# Patient Record
Sex: MALE | Race: WHITE | NOT HISPANIC OR LATINO | URBAN - METROPOLITAN AREA
[De-identification: names, ages, dates, MRNs, and addresses within clinical notes are randomized per-mention and may not be internally consistent; named-entity substitution may affect disease eponyms.]

---

## 2022-08-12 VITALS
RESPIRATION RATE: 16 BRPM | OXYGEN SATURATION: 96 % | HEIGHT: 74 IN | SYSTOLIC BLOOD PRESSURE: 116 MMHG | HEART RATE: 60 BPM | DIASTOLIC BLOOD PRESSURE: 70 MMHG | WEIGHT: 203.93 LBS | TEMPERATURE: 98 F

## 2022-08-12 NOTE — ASU PATIENT PROFILE, ADULT - FALL HARM RISK - UNIVERSAL INTERVENTIONS
Bed in lowest position, wheels locked, appropriate side rails in place/Call bell, personal items and telephone in reach/Instruct patient to call for assistance before getting out of bed or chair/Non-slip footwear when patient is out of bed/[place] to call system/Physically safe environment - no spills, clutter or unnecessary equipment/Purposeful Proactive Rounding/Room/bathroom lighting operational, light cord in reach

## 2022-08-15 ENCOUNTER — INPATIENT (INPATIENT)
Facility: HOSPITAL | Age: 47
LOS: 1 days | Discharge: HOME | DRG: 909 | End: 2022-08-17
Attending: SURGERY | Admitting: SURGERY
Payer: COMMERCIAL

## 2022-08-15 DIAGNOSIS — Z90.49 ACQUIRED ABSENCE OF OTHER SPECIFIED PARTS OF DIGESTIVE TRACT: Chronic | ICD-10-CM

## 2022-08-15 PROCEDURE — 88302 TISSUE EXAM BY PATHOLOGIST: CPT | Mod: 26

## 2022-08-15 DEVICE — MESH HERNIA VENTRALEX ST SMALL 1.7": Type: IMPLANTABLE DEVICE | Status: FUNCTIONAL

## 2022-08-15 DEVICE — MESH HERNIA VENTRALEX ST MEDIUM 2.5": Type: IMPLANTABLE DEVICE | Status: FUNCTIONAL

## 2022-08-15 RX ORDER — GABAPENTIN 400 MG/1
600 CAPSULE ORAL THREE TIMES A DAY
Refills: 0 | Status: DISCONTINUED | OUTPATIENT
Start: 2022-08-15 | End: 2022-08-16

## 2022-08-15 RX ORDER — CEFAZOLIN SODIUM 1 G
2000 VIAL (EA) INJECTION EVERY 8 HOURS
Refills: 0 | Status: DISCONTINUED | OUTPATIENT
Start: 2022-08-16 | End: 2022-08-16

## 2022-08-15 RX ORDER — ACETAMINOPHEN 500 MG
1000 TABLET ORAL EVERY 6 HOURS
Refills: 0 | Status: DISCONTINUED | OUTPATIENT
Start: 2022-08-15 | End: 2022-08-16

## 2022-08-15 RX ORDER — HEPARIN SODIUM 5000 [USP'U]/ML
5000 INJECTION INTRAVENOUS; SUBCUTANEOUS EVERY 8 HOURS
Refills: 0 | Status: DISCONTINUED | OUTPATIENT
Start: 2022-08-16 | End: 2022-08-16

## 2022-08-15 RX ORDER — OXYCODONE HYDROCHLORIDE 5 MG/1
1 TABLET ORAL
Qty: 10 | Refills: 0
Start: 2022-08-15 | End: 2022-08-17

## 2022-08-15 RX ORDER — DOCUSATE SODIUM 100 MG
1 CAPSULE ORAL
Qty: 15 | Refills: 0
Start: 2022-08-15 | End: 2022-08-19

## 2022-08-15 RX ORDER — OXYCODONE HYDROCHLORIDE 5 MG/1
5 TABLET ORAL EVERY 4 HOURS
Refills: 0 | Status: DISCONTINUED | OUTPATIENT
Start: 2022-08-15 | End: 2022-08-16

## 2022-08-15 RX ORDER — DEXTROSE MONOHYDRATE, SODIUM CHLORIDE, AND POTASSIUM CHLORIDE 50; .745; 4.5 G/1000ML; G/1000ML; G/1000ML
1000 INJECTION, SOLUTION INTRAVENOUS
Refills: 0 | Status: DISCONTINUED | OUTPATIENT
Start: 2022-08-15 | End: 2022-08-16

## 2022-08-15 RX ORDER — HYDROMORPHONE HYDROCHLORIDE 2 MG/ML
0.5 INJECTION INTRAMUSCULAR; INTRAVENOUS; SUBCUTANEOUS EVERY 6 HOURS
Refills: 0 | Status: DISCONTINUED | OUTPATIENT
Start: 2022-08-15 | End: 2022-08-16

## 2022-08-15 RX ADMIN — HEPARIN SODIUM 5000 UNIT(S): 5000 INJECTION INTRAVENOUS; SUBCUTANEOUS at 23:02

## 2022-08-15 RX ADMIN — Medication 1000 MILLIGRAM(S): at 23:02

## 2022-08-15 RX ADMIN — GABAPENTIN 600 MILLIGRAM(S): 400 CAPSULE ORAL at 23:02

## 2022-08-15 RX ADMIN — DEXTROSE MONOHYDRATE, SODIUM CHLORIDE, AND POTASSIUM CHLORIDE 75 MILLILITER(S): 50; .745; 4.5 INJECTION, SOLUTION INTRAVENOUS at 19:10

## 2022-08-15 RX ADMIN — Medication 100 MILLIGRAM(S): at 23:02

## 2022-08-15 NOTE — ASU DISCHARGE PLAN (ADULT/PEDIATRIC) - ASU DC SPECIAL INSTRUCTIONSFT
- Remain hunched 30 degrees at hip.   - Do not place hot or cold packs on incision  - Take colace for as long as you are taking opioid pain medication.

## 2022-08-15 NOTE — ASU DISCHARGE PLAN (ADULT/PEDIATRIC) - PROVIDER TOKENS
PROVIDER:[TOKEN:[[id number]],FOLLOWUP:[1 week]],PROVIDER:[TOKEN:[[id number]],FOLLOWUP:[1 week]]

## 2022-08-15 NOTE — ASU DISCHARGE PLAN (ADULT/PEDIATRIC) - CARE PROVIDER_API CALL
Philip Greer)  Surgery  [address], [address]  [place] [address]  Phone: [phone]  Fax: [phone]  Follow Up Time: 1 week    Starla Caldwell)  Plastic Surgery; Surgery  [address] [place] [id number]  Phone: [phone]  Fax: [phone]  Follow Up Time: 1 week

## 2022-08-15 NOTE — PROGRESS NOTE ADULT - SUBJECTIVE AND OBJECTIVE BOX
Procedure:  Surgeon:    S: Pt has no complaints. Denies CP, SOB, KENDALL, calf tenderness. Pain controlled with medication.    O:  T(C): 36.7 (08-15-22 @ 20:00), Max: 36.7 (08-15-22 @ 20:00)  T(F): 98.1 (08-15-22 @ 20:00), Max: 98.1 (08-15-22 @ 20:00)  HR: 68 (08-15-22 @ 20:00) (63 - 69)  BP: 96/55 (08-15-22 @ 20:00) (90/55 - 104/52)  RR: 19 (08-15-22 @ 20:00) (15 - 20)  SpO2: 95% (08-15-22 @ 20:00) (95% - 98%)  Wt(kg): --            Gen: NAD, resting comfortably in bed  C/V: NSR  Pulm: Nonlabored breathing, no respiratory distress  Abd: soft, NT/ND Incision:  Extrem: WWP, no calf edema, SCDs in place      A/P: 47yMale s/p above procedure  Diet:  IVF:  Pain/nausea control  DVT ppx:  Dispo plan: Procedure: Incisional hernia repair with mesh (w/ plastics-abdominoplasy and liposuction)  Surgeon: Dr. Greer    S: Pt seen and examined at bedside in PACU. He has no complaints. Denies N/V, CP, SOB, KENDALL, calf tenderness, dizziness, lightheadedness. Pain well controlled with medication. Pt flexed 30 degrees at hips, aware that he cannot lay flat or stand up straight. Vitally stable.    O:  T(C): 36.7 (08-15-22 @ 20:00), Max: 36.7 (08-15-22 @ 20:00)  T(F): 98.1 (08-15-22 @ 20:00), Max: 98.1 (08-15-22 @ 20:00)  HR: 68 (08-15-22 @ 20:00) (63 - 69)  BP: 96/55 (08-15-22 @ 20:00) (90/55 - 104/52)  RR: 19 (08-15-22 @ 20:00) (15 - 20)  SpO2: 95% (08-15-22 @ 20:00) (95% - 98%)      Gen: NAD, resting comfortably in bed  C/V: NSR  Pulm: Nonlabored breathing, no respiratory distress  Abd: soft, non-distended, non-tender, no rebound or guarding, abd binder in place, JAMES w SS drainage  Extrem: WWP, no calf edema, SCDs in place      A/P: 47M with no significant PMH/PSH now s/p liposuction, abdominoplasty, and ventral incisional hernia repair with mesh x2 8/15.    Admit 23 obs regional 9 [facility], Dr. Greer, T4  FLD, adv to regular in AM if tolerating   Ancef   Pain/nausea control PRN (no toradol, tylenol RTC, gabapentin 600 TID RTC, dilaudid breakthrough, oxycodone severe, Zofran PRN)  OOBA/SCDs/SQH/IS  AM Labs   Activity and positioning per plastic surgery

## 2022-08-16 LAB
ALBUMIN SERPL ELPH-MCNC: 2.6 G/DL — LOW (ref 3.3–5)
ALBUMIN SERPL ELPH-MCNC: 3.5 G/DL — SIGNIFICANT CHANGE UP (ref 3.3–5)
ALP SERPL-CCNC: 33 U/L — LOW (ref 40–120)
ALP SERPL-CCNC: 43 U/L — SIGNIFICANT CHANGE UP (ref 40–120)
ALT FLD-CCNC: 13 U/L — SIGNIFICANT CHANGE UP (ref 10–45)
ALT FLD-CCNC: 14 U/L — SIGNIFICANT CHANGE UP (ref 10–45)
ANION GAP SERPL CALC-SCNC: 7 MMOL/L — SIGNIFICANT CHANGE UP (ref 5–17)
ANION GAP SERPL CALC-SCNC: 7 MMOL/L — SIGNIFICANT CHANGE UP (ref 5–17)
APTT BLD: 23 SEC — LOW (ref 27.5–35.5)
AST SERPL-CCNC: 16 U/L — SIGNIFICANT CHANGE UP (ref 10–40)
AST SERPL-CCNC: 25 U/L — SIGNIFICANT CHANGE UP (ref 10–40)
BASE EXCESS BLDA CALC-SCNC: -3.3 MMOL/L — LOW (ref -2–3)
BILIRUB SERPL-MCNC: 0.8 MG/DL — SIGNIFICANT CHANGE UP (ref 0.2–1.2)
BILIRUB SERPL-MCNC: 0.8 MG/DL — SIGNIFICANT CHANGE UP (ref 0.2–1.2)
BLD GP AB SCN SERPL QL: NEGATIVE — SIGNIFICANT CHANGE UP
BLD GP AB SCN SERPL QL: NEGATIVE — SIGNIFICANT CHANGE UP
BUN SERPL-MCNC: 13 MG/DL — SIGNIFICANT CHANGE UP (ref 7–23)
BUN SERPL-MCNC: 13 MG/DL — SIGNIFICANT CHANGE UP (ref 7–23)
CALCIUM SERPL-MCNC: 7.8 MG/DL — LOW (ref 8.4–10.5)
CALCIUM SERPL-MCNC: 8.3 MG/DL — LOW (ref 8.4–10.5)
CHLORIDE SERPL-SCNC: 105 MMOL/L — SIGNIFICANT CHANGE UP (ref 96–108)
CHLORIDE SERPL-SCNC: 106 MMOL/L — SIGNIFICANT CHANGE UP (ref 96–108)
CO2 BLDA-SCNC: 22 MMOL/L — SIGNIFICANT CHANGE UP (ref 19–24)
CO2 SERPL-SCNC: 24 MMOL/L — SIGNIFICANT CHANGE UP (ref 22–31)
CO2 SERPL-SCNC: 25 MMOL/L — SIGNIFICANT CHANGE UP (ref 22–31)
COHGB MFR BLDA: 1.8 % — SIGNIFICANT CHANGE UP
CREAT SERPL-MCNC: 0.89 MG/DL — SIGNIFICANT CHANGE UP (ref 0.5–1.3)
CREAT SERPL-MCNC: 0.9 MG/DL — SIGNIFICANT CHANGE UP (ref 0.5–1.3)
EGFR: 105 ML/MIN/1.73M2 — SIGNIFICANT CHANGE UP
EGFR: 106 ML/MIN/1.73M2 — SIGNIFICANT CHANGE UP
GLUCOSE SERPL-MCNC: 132 MG/DL — HIGH (ref 70–99)
GLUCOSE SERPL-MCNC: 164 MG/DL — HIGH (ref 70–99)
HCO3 BLDA-SCNC: 21 MMOL/L — SIGNIFICANT CHANGE UP (ref 21–28)
HCT VFR BLD CALC: 25.9 % — LOW (ref 39–50)
HCT VFR BLD CALC: 26.4 % — LOW (ref 39–50)
HCT VFR BLD CALC: 26.6 % — LOW (ref 39–50)
HCT VFR BLD CALC: 32.1 % — LOW (ref 39–50)
HGB BLD-MCNC: 11.3 G/DL — LOW (ref 13–17)
HGB BLD-MCNC: 9 G/DL — LOW (ref 13–17)
HGB BLD-MCNC: 9.2 G/DL — LOW (ref 13–17)
HGB BLD-MCNC: 9.3 G/DL — LOW (ref 13–17)
HGB BLDA-MCNC: 7.3 G/DL — LOW (ref 12.6–17.4)
INR BLD: 1.11 — SIGNIFICANT CHANGE UP (ref 0.88–1.16)
INR BLD: 1.21 — HIGH (ref 0.88–1.16)
MAGNESIUM SERPL-MCNC: 1.6 MG/DL — SIGNIFICANT CHANGE UP (ref 1.6–2.6)
MAGNESIUM SERPL-MCNC: 1.8 MG/DL — SIGNIFICANT CHANGE UP (ref 1.6–2.6)
MCHC RBC-ENTMCNC: 32.9 PG — SIGNIFICANT CHANGE UP (ref 27–34)
MCHC RBC-ENTMCNC: 33 PG — SIGNIFICANT CHANGE UP (ref 27–34)
MCHC RBC-ENTMCNC: 33 PG — SIGNIFICANT CHANGE UP (ref 27–34)
MCHC RBC-ENTMCNC: 33.3 PG — SIGNIFICANT CHANGE UP (ref 27–34)
MCHC RBC-ENTMCNC: 34.6 GM/DL — SIGNIFICANT CHANGE UP (ref 32–36)
MCHC RBC-ENTMCNC: 34.7 GM/DL — SIGNIFICANT CHANGE UP (ref 32–36)
MCHC RBC-ENTMCNC: 35.2 GM/DL — SIGNIFICANT CHANGE UP (ref 32–36)
MCHC RBC-ENTMCNC: 35.2 GM/DL — SIGNIFICANT CHANGE UP (ref 32–36)
MCV RBC AUTO: 93.3 FL — SIGNIFICANT CHANGE UP (ref 80–100)
MCV RBC AUTO: 94.7 FL — SIGNIFICANT CHANGE UP (ref 80–100)
MCV RBC AUTO: 94.9 FL — SIGNIFICANT CHANGE UP (ref 80–100)
MCV RBC AUTO: 95.3 FL — SIGNIFICANT CHANGE UP (ref 80–100)
METHGB MFR BLDA: 0.3 % — SIGNIFICANT CHANGE UP
NRBC # BLD: 0 /100 WBCS — SIGNIFICANT CHANGE UP (ref 0–0)
OXYHGB MFR BLDA: 97.8 % — HIGH (ref 90–95)
PCO2 BLDA: 34 MMHG — LOW (ref 35–48)
PH BLDA: 7.4 — SIGNIFICANT CHANGE UP (ref 7.35–7.45)
PHOSPHATE SERPL-MCNC: 3.5 MG/DL — SIGNIFICANT CHANGE UP (ref 2.5–4.5)
PHOSPHATE SERPL-MCNC: 3.9 MG/DL — SIGNIFICANT CHANGE UP (ref 2.5–4.5)
PLATELET # BLD AUTO: 186 K/UL — SIGNIFICANT CHANGE UP (ref 150–400)
PLATELET # BLD AUTO: 189 K/UL — SIGNIFICANT CHANGE UP (ref 150–400)
PLATELET # BLD AUTO: 207 K/UL — SIGNIFICANT CHANGE UP (ref 150–400)
PLATELET # BLD AUTO: 248 K/UL — SIGNIFICANT CHANGE UP (ref 150–400)
PO2 BLDA: 341 MMHG — HIGH (ref 83–108)
POTASSIUM SERPL-MCNC: 4.5 MMOL/L — SIGNIFICANT CHANGE UP (ref 3.5–5.3)
POTASSIUM SERPL-MCNC: 4.7 MMOL/L — SIGNIFICANT CHANGE UP (ref 3.5–5.3)
POTASSIUM SERPL-SCNC: 4.5 MMOL/L — SIGNIFICANT CHANGE UP (ref 3.5–5.3)
POTASSIUM SERPL-SCNC: 4.7 MMOL/L — SIGNIFICANT CHANGE UP (ref 3.5–5.3)
PROT SERPL-MCNC: 3.9 G/DL — LOW (ref 6–8.3)
PROT SERPL-MCNC: 5.1 G/DL — LOW (ref 6–8.3)
PROTHROM AB SERPL-ACNC: 13.2 SEC — SIGNIFICANT CHANGE UP (ref 10.5–13.4)
PROTHROM AB SERPL-ACNC: 14.4 SEC — HIGH (ref 10.5–13.4)
RBC # BLD: 2.73 M/UL — LOW (ref 4.2–5.8)
RBC # BLD: 2.79 M/UL — LOW (ref 4.2–5.8)
RBC # BLD: 2.83 M/UL — LOW (ref 4.2–5.8)
RBC # BLD: 3.39 M/UL — LOW (ref 4.2–5.8)
RBC # FLD: 11.8 % — SIGNIFICANT CHANGE UP (ref 10.3–14.5)
RBC # FLD: 11.9 % — SIGNIFICANT CHANGE UP (ref 10.3–14.5)
RBC # FLD: 12.1 % — SIGNIFICANT CHANGE UP (ref 10.3–14.5)
RBC # FLD: 12.2 % — SIGNIFICANT CHANGE UP (ref 10.3–14.5)
RH IG SCN BLD-IMP: POSITIVE — SIGNIFICANT CHANGE UP
RH IG SCN BLD-IMP: POSITIVE — SIGNIFICANT CHANGE UP
SAO2 % BLDA: 100 % — HIGH (ref 94–98)
SODIUM SERPL-SCNC: 136 MMOL/L — SIGNIFICANT CHANGE UP (ref 135–145)
SODIUM SERPL-SCNC: 138 MMOL/L — SIGNIFICANT CHANGE UP (ref 135–145)
WBC # BLD: 11.24 K/UL — HIGH (ref 3.8–10.5)
WBC # BLD: 15.56 K/UL — HIGH (ref 3.8–10.5)
WBC # BLD: 6.02 K/UL — SIGNIFICANT CHANGE UP (ref 3.8–10.5)
WBC # BLD: 7.12 K/UL — SIGNIFICANT CHANGE UP (ref 3.8–10.5)
WBC # FLD AUTO: 11.24 K/UL — HIGH (ref 3.8–10.5)
WBC # FLD AUTO: 15.56 K/UL — HIGH (ref 3.8–10.5)
WBC # FLD AUTO: 6.02 K/UL — SIGNIFICANT CHANGE UP (ref 3.8–10.5)
WBC # FLD AUTO: 7.12 K/UL — SIGNIFICANT CHANGE UP (ref 3.8–10.5)

## 2022-08-16 PROCEDURE — 93010 ELECTROCARDIOGRAM REPORT: CPT

## 2022-08-16 DEVICE — ARISTA 3GR: Type: IMPLANTABLE DEVICE | Status: FUNCTIONAL

## 2022-08-16 RX ORDER — ACETAMINOPHEN 500 MG
1000 TABLET ORAL EVERY 6 HOURS
Refills: 0 | Status: DISCONTINUED | OUTPATIENT
Start: 2022-08-16 | End: 2022-08-17

## 2022-08-16 RX ORDER — CEFAZOLIN SODIUM 1 G
2000 VIAL (EA) INJECTION EVERY 8 HOURS
Refills: 0 | Status: DISCONTINUED | OUTPATIENT
Start: 2022-08-16 | End: 2022-08-17

## 2022-08-16 RX ORDER — GABAPENTIN 400 MG/1
600 CAPSULE ORAL THREE TIMES A DAY
Refills: 0 | Status: DISCONTINUED | OUTPATIENT
Start: 2022-08-16 | End: 2022-08-17

## 2022-08-16 RX ORDER — MAGNESIUM SULFATE 500 MG/ML
1 VIAL (ML) INJECTION ONCE
Refills: 0 | Status: DISCONTINUED | OUTPATIENT
Start: 2022-08-16 | End: 2022-08-16

## 2022-08-16 RX ORDER — ONDANSETRON 8 MG/1
4 TABLET, FILM COATED ORAL ONCE
Refills: 0 | Status: COMPLETED | OUTPATIENT
Start: 2022-08-16 | End: 2022-08-16

## 2022-08-16 RX ORDER — SODIUM CHLORIDE 9 MG/ML
1000 INJECTION, SOLUTION INTRAVENOUS
Refills: 0 | Status: DISCONTINUED | OUTPATIENT
Start: 2022-08-16 | End: 2022-08-16

## 2022-08-16 RX ORDER — SODIUM CHLORIDE 9 MG/ML
1000 INJECTION, SOLUTION INTRAVENOUS ONCE
Refills: 0 | Status: COMPLETED | OUTPATIENT
Start: 2022-08-16 | End: 2022-08-16

## 2022-08-16 RX ORDER — HYDROMORPHONE HYDROCHLORIDE 2 MG/ML
0.5 INJECTION INTRAMUSCULAR; INTRAVENOUS; SUBCUTANEOUS EVERY 6 HOURS
Refills: 0 | Status: DISCONTINUED | OUTPATIENT
Start: 2022-08-16 | End: 2022-08-17

## 2022-08-16 RX ORDER — SODIUM CHLORIDE 9 MG/ML
1000 INJECTION, SOLUTION INTRAVENOUS
Refills: 0 | Status: DISCONTINUED | OUTPATIENT
Start: 2022-08-16 | End: 2022-08-17

## 2022-08-16 RX ORDER — ONDANSETRON 8 MG/1
4 TABLET, FILM COATED ORAL EVERY 6 HOURS
Refills: 0 | Status: DISCONTINUED | OUTPATIENT
Start: 2022-08-16 | End: 2022-08-17

## 2022-08-16 RX ORDER — OXYCODONE HYDROCHLORIDE 5 MG/1
5 TABLET ORAL EVERY 6 HOURS
Refills: 0 | Status: DISCONTINUED | OUTPATIENT
Start: 2022-08-16 | End: 2022-08-17

## 2022-08-16 RX ADMIN — SODIUM CHLORIDE 130 MILLILITER(S): 9 INJECTION, SOLUTION INTRAVENOUS at 04:10

## 2022-08-16 RX ADMIN — Medication 1000 MILLIGRAM(S): at 05:11

## 2022-08-16 RX ADMIN — Medication 100 MILLIGRAM(S): at 22:29

## 2022-08-16 RX ADMIN — OXYCODONE HYDROCHLORIDE 5 MILLIGRAM(S): 5 TABLET ORAL at 22:30

## 2022-08-16 RX ADMIN — ONDANSETRON 4 MILLIGRAM(S): 8 TABLET, FILM COATED ORAL at 05:18

## 2022-08-16 RX ADMIN — Medication 100 MILLIGRAM(S): at 06:53

## 2022-08-16 RX ADMIN — Medication 1000 MILLIGRAM(S): at 04:11

## 2022-08-16 RX ADMIN — SODIUM CHLORIDE 1000 MILLILITER(S): 9 INJECTION, SOLUTION INTRAVENOUS at 05:05

## 2022-08-16 RX ADMIN — Medication 1000 MILLIGRAM(S): at 00:02

## 2022-08-16 RX ADMIN — Medication 100 MILLIGRAM(S): at 15:23

## 2022-08-16 RX ADMIN — SODIUM CHLORIDE 90 MILLILITER(S): 9 INJECTION, SOLUTION INTRAVENOUS at 22:35

## 2022-08-16 RX ADMIN — Medication 1000 MILLIGRAM(S): at 17:40

## 2022-08-16 RX ADMIN — SODIUM CHLORIDE 1000 MILLILITER(S): 9 INJECTION, SOLUTION INTRAVENOUS at 03:10

## 2022-08-16 RX ADMIN — GABAPENTIN 600 MILLIGRAM(S): 400 CAPSULE ORAL at 16:17

## 2022-08-16 RX ADMIN — SODIUM CHLORIDE 120 MILLILITER(S): 9 INJECTION, SOLUTION INTRAVENOUS at 07:14

## 2022-08-16 RX ADMIN — OXYCODONE HYDROCHLORIDE 5 MILLIGRAM(S): 5 TABLET ORAL at 23:00

## 2022-08-16 RX ADMIN — GABAPENTIN 600 MILLIGRAM(S): 400 CAPSULE ORAL at 22:29

## 2022-08-16 RX ADMIN — Medication 1000 MILLIGRAM(S): at 18:10

## 2022-08-16 NOTE — H&P ADULT - ASSESSMENT
47M with no significant PMH/PSH now s/p liposuction, abdominoplasty, and ventral incisional hernia repair with mesh x2 8/15 RTOR for bleed now s/p evacuation of 1.2L hematoma and control of bleeding perforating vessel 8/16.    Regular diet   IVF   Ancef   Pain/nausea control PRN   OOBA/SCDs/IS/hold SQH   Activity and positioning per plastic surgery  AM labs   JAMES x2, strip TID     Plan discussed with attending and chief resident.   ____________________________________________________  [facility]   Surgery

## 2022-08-16 NOTE — PROGRESS NOTE ADULT - SUBJECTIVE AND OBJECTIVE BOX
POd 1 hernia repair, TTyck, liposuction posteior brenton    pt BP 50's overnight,   70 this am  3rd bolus ordered    BP 94/55 after 3rd bolus HR 84    exam: abdominal flap distended    likely bleeding    no distention posterior flank    TO OR FOR exploration    discussed with Dr. Greer  in agreement

## 2022-08-16 NOTE — BRIEF OPERATIVE NOTE - OPERATION/FINDINGS
Liposuction by plastic surgery. Abdominal flap raised by plastic surgery. LLQ incisional hernia with incarcerated fat, hernia sac ligated and reduced.  Small ventrelex ST mesh secured with Maxon x2 and anterior defect closed with Maxon x2. Umbilical hernia reduced manually and medium ventrelex ST mesh secured with Maxon x2 figure-of-eight and anterior defect closed with Maxon x3. Midline plicated from xyphoid to pubis. Case completion by plastic surgery.
abdominoplasty incision reopened and JAMES drains removed; hematoma evacuated and anterior rectus irrigated. Bleeding  vessels identified and ligated, and hemostasis achieved. Roly x 3 used. Abdominoplasty incision closed in 3 layers
Reopened abdominoplasty incision. Removal of JAMES drains x2. Identification of actively bleeding vessel L anterior rectus, likely . Extensive evacuation of hematoma 1.2L and washout. Hemostasis confirmed. TXA administered IV. Roly x3. New JAMES drain x2 placed. Incision closed by plastic surgery.

## 2022-08-16 NOTE — BRIEF OPERATIVE NOTE - ANTIBIOTIC PROTOCOL
Followed protocol
Ancef completing from floors/Followed protocol
Ancef 2G, redosed around 415pm/Followed protocol

## 2022-08-16 NOTE — PROGRESS NOTE ADULT - SUBJECTIVE AND OBJECTIVE BOX
SURGERY DAILY PROGRESS NOTE:     SUBJECTIVE/ROS: No acute overnight events. Patient seen and examined at bedside during morning rounds. Asymptomatic. Systolic BP's low to the 70-80's overnight, despite NS bolus x 2.     OBJECTIVE:    Vital Signs Last 24 Hrs  T(C): 36.9 (16 Aug 2022 06:54), Max: 36.9 (16 Aug 2022 06:02)  T(F): 98.4 (16 Aug 2022 06:54), Max: 98.4 (16 Aug 2022 06:02)  HR: 74 (16 Aug 2022 06:54) (51 - 79)  BP: 94/55 (16 Aug 2022 06:54) (49/30 - 109/69)  BP(mean): 69 (16 Aug 2022 06:45) (54 - 75)  RR: 18 (16 Aug 2022 06:54) (15 - 20)  SpO2: 97% (16 Aug 2022 06:45) (92% - 98%)    Parameters below as of 16 Aug 2022 06:45  Patient On (Oxygen Delivery Method): room air    I&O's Detail    15 Aug 2022 07:01  -  16 Aug 2022 07:00  --------------------------------------------------------  IN:    dextrose 5% + sodium chloride 0.9% w/ Additives: 375 mL    Lactated Ringers: 260 mL    Lactated Ringers Bolus: 2000 mL    Oral Fluid: 1000 mL  Total IN: 3635 mL    OUT:    Blood Loss (mL): 200 mL    Bulb (mL): 20 mL    Bulb (mL): 16 mL    Voided (mL): 1900 mL  Total OUT: 2136 mL    Total NET: 1499 mL    PHYSICAL EXAM:  Constitutional: well developed, well nourished, NAD  Eyes: anicteric  ENMT: normal facies, symmetric  Neck: supple  Respiratory: airway patent, unlabored breathing  Gastrointestinal: abdomen soft, nontender, somewhat distended, ecchymosis at upper abdomen. JAMES x 2 SS, no flank hematoma visible  Extremities: FROM, warm    LABS:                        9.0    11.24 )-----------( 207      ( 16 Aug 2022 06:47 )             25.9     08-16    136  |  105  |  13  ----------------------------<  164<H>  4.7   |  24  |  0.89    Ca    8.3<L>      16 Aug 2022 03:31  Phos  3.9     08-16  Mg     1.8     08-16    TPro  5.1<L>  /  Alb  3.5  /  TBili  0.8  /  DBili  x   /  AST  25  /  ALT  14  /  AlkPhos  43  08-16    PT/INR - ( 16 Aug 2022 03:30 )   PT: 13.2 sec;   INR: 1.11

## 2022-08-16 NOTE — BRIEF OPERATIVE NOTE - NSICDXBRIEFPREOP_GEN_ALL_CORE_FT
PRE-OP DIAGNOSIS:  Hematoma 16-Aug-2022 10:31:34  Angelita Wagner  
PRE-OP DIAGNOSIS:  Incisional hernia 15-Aug-2022 18:04:04  Angelita Wagner

## 2022-08-16 NOTE — BRIEF OPERATIVE NOTE - VENOUS THROMBOEMBOLISM PROPHYLAXIS THERAPY
Requested medication(s) are due for refill today: Yes  Patient has already received a courtesy refill: No  Other reason request has been forwarded to provider: failed protocol
SCDs

## 2022-08-16 NOTE — BRIEF OPERATIVE NOTE - NSICDXBRIEFPROCEDURE_GEN_ALL_CORE_FT
PROCEDURES:  Incisional hernia repair 15-Aug-2022 18:03:51 x2 with mesh Angelita Wagner  
PROCEDURES:  Evacuation, hematoma, torso 16-Aug-2022 10:31:20  Angelita Wagner

## 2022-08-16 NOTE — PACU DISCHARGE NOTE - COMMENTS
Patient hemodynamically stable. Abdomen soft upon palpation, non tender. Dressing CDI. x 2 JAMES intact and draining sero-sang. Barros intact and adequate urine output. D/C a-line. Denies pain or nausea. Cleared by anesthesiologist to go to floor. report given to EMILY Sharp.

## 2022-08-16 NOTE — BRIEF OPERATIVE NOTE - NSICDXBRIEFPOSTOP_GEN_ALL_CORE_FT
POST-OP DIAGNOSIS:  Incisional hernia 15-Aug-2022 18:04:08  Angelita Wagner  
POST-OP DIAGNOSIS:  Hematoma 16-Aug-2022 10:31:47  Angelita Wagner

## 2022-08-16 NOTE — H&P ADULT - HISTORY OF PRESENT ILLNESS
47M with no significant PMH/PSH now s/p liposuction, abdominoplasty, and ventral incisional hernia repair with mesh x2 8/15 RTOR for bleed now s/p evacuation of 1.2L hematoma and control of bleeding perforating vessel 8/16.

## 2022-08-16 NOTE — PROGRESS NOTE ADULT - SUBJECTIVE AND OBJECTIVE BOX
INTERVAL HPI/OVERNIGHT EVENTS: POC wnl, TOV passed; pain well controlled, 1 JAMES -ss, see chart note, several episodes of lightheadedness when standing, SBP to 50s, HR 60s, pt transferred to [facility]. LR 1L bolus x 2.    STATUS POST: : liposuction, abdominoplasty, ventral hernia repair x2 with ventrelex mesh    POST OPERATIVE DAY #: 1    SUBJECTIVE: Patient seen and examined at bedside with chief. Has no complaints at this time. Denies abdominal pain, n/v, sob, cp. +F/-BM.      ceFAZolin   IVPB 2000 milliGRAM(s) IV Intermittent every 8 hours  heparin   Injectable 5000 Unit(s) SubCutaneous every 8 hours      Vital Signs Last 24 Hrs  T(C): 36.9 (16 Aug 2022 07:18), Max: 36.9 (16 Aug 2022 06:02)  T(F): 98.4 (16 Aug 2022 06:54), Max: 98.4 (16 Aug 2022 06:02)  HR: 74 (16 Aug 2022 07:18) (51 - 79)  BP: 94/55 (16 Aug 2022 07:18) (49/30 - 109/69)  BP(mean): 69 (16 Aug 2022 07:18) (54 - 75)  RR: 18 (16 Aug 2022 07:18) (15 - 20)  SpO2: 97% (16 Aug 2022 07:18) (92% - 98%)    Parameters below as of 16 Aug 2022 06:45  Patient On (Oxygen Delivery Method): room air      I&O's Detail    15 Aug 2022 07:01  -  16 Aug 2022 07:00  --------------------------------------------------------  IN:    dextrose 5% + sodium chloride 0.9% w/ Additives: 375 mL    Lactated Ringers: 260 mL    Lactated Ringers Bolus: 2000 mL    Oral Fluid: 1000 mL  Total IN: 3635 mL    OUT:    Blood Loss (mL): 200 mL    Bulb (mL): 20 mL    Bulb (mL): 16 mL    Voided (mL): 1900 mL  Total OUT: 2136 mL    Total NET: 1499 mL          General: NAD, resting comfortably in bed  C/V: NSR  Pulm: Nonlabored breathing, no respiratory distress  Abd: soft, NT/ND. Mild bruising noted. Abdominal binder in place.  Extrem: WWP, no edema, SCDs in place        LABS:                        9.0    11.24 )-----------( 207      ( 16 Aug 2022 06:47 )             25.9     08-16    138  |  106  |  13  ----------------------------<  132<H>  4.5   |  25  |  0.90    Ca    7.8<L>      16 Aug 2022 06:43  Phos  3.5     08-16  Mg     1.6     08-16    TPro  3.9<L>  /  Alb  2.6<L>  /  TBili  0.8  /  DBili  x   /  AST  16  /  ALT  13  /  AlkPhos  33<L>  08-16    PT/INR - ( 16 Aug 2022 06:43 )   PT: 14.4 sec;   INR: 1.21          PTT - ( 16 Aug 2022 06:43 )  PTT:23.0 sec      RADIOLOGY & ADDITIONAL STUDIES:   Patient states been sick x 3 weeks. Patient requested by doctor to come and see about gallbladder.

## 2022-08-16 NOTE — CHART NOTE - NSCHARTNOTEFT_GEN_A_CORE
Patient is a 47 year old male with no past medical or surgical history who is s/p an incisional ventral hernia repair with mesh x 2, liposuction, and abdominoplasty who had an episode of lightheadedness when sitting on the edge of the bed to go to the bathroom. Per RN, BP was 49/30, HR 79 during the time of the episode. The patient was immediately placed in Trendelenburg position with resolution of his lightheadedness and BP 84/40, HR 61. Team 4 was called to bedside and assessed patient. He was immediately started on a 1L bolus of LR, and on assessment denied any lightheadedness/dizziness, pain, nausea, blurry vision or headaches. He has had adequate urine output, with 1 L produced since removal of raza and has adequate oral intake. On exam, his abdomen was soft, mildly tender to palpation, and nondistended. Dressings were in place with no surrounding erythema, induration, or fluctuance. JAMES drains x 2 with serosanguinous output.     Most recently, BP was 93/61, HR 64 and patient is asymptomatic.     Likely an orthostatic episode  -Upgrade to tele  -STAT labs  -EKG  -1L LR Bolus, 
STATUS POST:  Abdominal Hematoma evacuation and washout    SUBJECTIVE: Pt seen 2 hours post-op in PACU. Doing well no complaints. Denies dizziness, pain, N/V/F/C, chest pain, SOB, or leg swelling.     Vital Signs Last 24 Hrs  T(C): 37.2 (16 Aug 2022 10:00), Max: 37.2 (16 Aug 2022 10:00)  T(F): 99 (16 Aug 2022 10:00), Max: 99 (16 Aug 2022 10:00)  HR: 74 (16 Aug 2022 12:55) (51 - 79)  BP: 98/61 (16 Aug 2022 12:55) (49/30 - 109/69)  BP(mean): 75 (16 Aug 2022 12:55) (54 - 75)  RR: 15 (16 Aug 2022 12:55) (13 - 20)  SpO2: 97% (16 Aug 2022 12:55) (92% - 100%)    Parameters below as of 16 Aug 2022 12:55  Patient On (Oxygen Delivery Method): room air      I&O's Summary    15 Aug 2022 07:01  -  16 Aug 2022 07:00  --------------------------------------------------------  IN: 3635 mL / OUT: 2136 mL / NET: 1499 mL    16 Aug 2022 07:01  -  16 Aug 2022 13:08  --------------------------------------------------------  IN: 270 mL / OUT: 590 mL / NET: -320 mL      I&O's Detail    15 Aug 2022 07:01  -  16 Aug 2022 07:00  --------------------------------------------------------  IN:    dextrose 5% + sodium chloride 0.9% w/ Additives: 375 mL    Lactated Ringers: 260 mL    Lactated Ringers Bolus: 2000 mL    Oral Fluid: 1000 mL  Total IN: 3635 mL    OUT:    Blood Loss (mL): 200 mL    Bulb (mL): 20 mL    Bulb (mL): 16 mL    Voided (mL): 1900 mL  Total OUT: 2136 mL    Total NET: 1499 mL      16 Aug 2022 07:01  -  16 Aug 2022 13:08  --------------------------------------------------------  IN:    Lactated Ringers: 270 mL  Total IN: 270 mL    OUT:    Bulb (mL): 5 mL    Bulb (mL): 55 mL    Indwelling Catheter - Urethral (mL): 530 mL  Total OUT: 590 mL    Total NET: -320 mL            LABS:                        9.3    7.12  )-----------( 186      ( 16 Aug 2022 12:10 )             26.4     08-16    138  |  106  |  13  ----------------------------<  132<H>  4.5   |  25  |  0.90    Ca    7.8<L>      16 Aug 2022 06:43  Phos  3.5     08-16  Mg     1.6     08-16    TPro  3.9<L>  /  Alb  2.6<L>  /  TBili  0.8  /  DBili  x   /  AST  16  /  ALT  13  /  AlkPhos  33<L>  08-16    PT/INR - ( 16 Aug 2022 06:43 )   PT: 14.4 sec;   INR: 1.21          PTT - ( 16 Aug 2022 06:43 )  PTT:23.0 sec      Physical exam : Neuro: Alert and Oriented X 4. CN II-IX intact. No apparent focal neural deficits  HEENT: NCAT. Mucous membranes moist. EOMI  Respiratory: CTA b/l. no respiratory distress  Cardiovascular: NSR, RRR  Gastrointestinal: soft, NT/ND. No rebound/guarding. Ecchymosis on the anterior epigastric and b/l flanks.                 Incision: Dressings c/d/i.                 Drains: 2x JAMES draining serosanguinous fluid R>L  Extremities: (-) edema b/l. SCDs in place.        A/P: 47y Male   - Diet: Regular  - Activity: OOB as soon as possible  - Labs: CBC @1800  - Pain medication: Tylenol, Gabapentin, dilaudid, oxycodone  - Hold SQH  - Ancef until discharge  - Strip drains 3x daily

## 2022-08-17 VITALS — TEMPERATURE: 98 F

## 2022-08-17 LAB
ANION GAP SERPL CALC-SCNC: 5 MMOL/L — SIGNIFICANT CHANGE UP (ref 5–17)
ANION GAP SERPL CALC-SCNC: 8 MMOL/L — SIGNIFICANT CHANGE UP (ref 5–17)
BILIRUB SERPL-MCNC: 0.4 MG/DL — SIGNIFICANT CHANGE UP (ref 0.2–1.2)
BUN SERPL-MCNC: 11 MG/DL — SIGNIFICANT CHANGE UP (ref 7–23)
BUN SERPL-MCNC: 11 MG/DL — SIGNIFICANT CHANGE UP (ref 7–23)
CA-I BLD-SCNC: 4.6 MG/DL — SIGNIFICANT CHANGE UP (ref 4.5–5.6)
CALCIUM SERPL-MCNC: 7.8 MG/DL — LOW (ref 8.4–10.5)
CALCIUM SERPL-MCNC: 7.9 MG/DL — LOW (ref 8.4–10.5)
CHLORIDE SERPL-SCNC: 102 MMOL/L — SIGNIFICANT CHANGE UP (ref 96–108)
CHLORIDE SERPL-SCNC: 103 MMOL/L — SIGNIFICANT CHANGE UP (ref 96–108)
CO2 SERPL-SCNC: 25 MMOL/L — SIGNIFICANT CHANGE UP (ref 22–31)
CO2 SERPL-SCNC: 27 MMOL/L — SIGNIFICANT CHANGE UP (ref 22–31)
CREAT SERPL-MCNC: 0.86 MG/DL — SIGNIFICANT CHANGE UP (ref 0.5–1.3)
CREAT SERPL-MCNC: 0.9 MG/DL — SIGNIFICANT CHANGE UP (ref 0.5–1.3)
EGFR: 105 ML/MIN/1.73M2 — SIGNIFICANT CHANGE UP
EGFR: 107 ML/MIN/1.73M2 — SIGNIFICANT CHANGE UP
GLUCOSE SERPL-MCNC: 134 MG/DL — HIGH (ref 70–99)
GLUCOSE SERPL-MCNC: 140 MG/DL — HIGH (ref 70–99)
HCT VFR BLD CALC: 22 % — LOW (ref 39–50)
HCT VFR BLD CALC: 22.7 % — LOW (ref 39–50)
HCT VFR BLD CALC: 22.8 % — LOW (ref 39–50)
HGB BLD-MCNC: 7.8 G/DL — LOW (ref 13–17)
HGB BLD-MCNC: 7.8 G/DL — LOW (ref 13–17)
HGB BLD-MCNC: 8.2 G/DL — LOW (ref 13–17)
INR BLD: 1.07 — SIGNIFICANT CHANGE UP (ref 0.88–1.16)
MAGNESIUM SERPL-MCNC: 1.9 MG/DL — SIGNIFICANT CHANGE UP (ref 1.6–2.6)
MCHC RBC-ENTMCNC: 32.6 PG — SIGNIFICANT CHANGE UP (ref 27–34)
MCHC RBC-ENTMCNC: 33.2 PG — SIGNIFICANT CHANGE UP (ref 27–34)
MCHC RBC-ENTMCNC: 33.3 PG — SIGNIFICANT CHANGE UP (ref 27–34)
MCHC RBC-ENTMCNC: 34.4 GM/DL — SIGNIFICANT CHANGE UP (ref 32–36)
MCHC RBC-ENTMCNC: 35.5 GM/DL — SIGNIFICANT CHANGE UP (ref 32–36)
MCHC RBC-ENTMCNC: 36 GM/DL — SIGNIFICANT CHANGE UP (ref 32–36)
MCV RBC AUTO: 92.3 FL — SIGNIFICANT CHANGE UP (ref 80–100)
MCV RBC AUTO: 94 FL — SIGNIFICANT CHANGE UP (ref 80–100)
MCV RBC AUTO: 95 FL — SIGNIFICANT CHANGE UP (ref 80–100)
MELD SCORE WITH DIALYSIS: 21 POINTS — SIGNIFICANT CHANGE UP
MELD SCORE WITHOUT DIALYSIS: 7 POINTS — SIGNIFICANT CHANGE UP
NRBC # BLD: 0 /100 WBCS — SIGNIFICANT CHANGE UP (ref 0–0)
PHOSPHATE SERPL-MCNC: 3.1 MG/DL — SIGNIFICANT CHANGE UP (ref 2.5–4.5)
PLATELET # BLD AUTO: 172 K/UL — SIGNIFICANT CHANGE UP (ref 150–400)
PLATELET # BLD AUTO: 177 K/UL — SIGNIFICANT CHANGE UP (ref 150–400)
PLATELET # BLD AUTO: 189 K/UL — SIGNIFICANT CHANGE UP (ref 150–400)
POTASSIUM SERPL-MCNC: 4.2 MMOL/L — SIGNIFICANT CHANGE UP (ref 3.5–5.3)
POTASSIUM SERPL-MCNC: 4.3 MMOL/L — SIGNIFICANT CHANGE UP (ref 3.5–5.3)
POTASSIUM SERPL-SCNC: 4.2 MMOL/L — SIGNIFICANT CHANGE UP (ref 3.5–5.3)
POTASSIUM SERPL-SCNC: 4.3 MMOL/L — SIGNIFICANT CHANGE UP (ref 3.5–5.3)
PROTHROM AB SERPL-ACNC: 12.7 SEC — SIGNIFICANT CHANGE UP (ref 10.5–13.4)
RBC # BLD: 2.34 M/UL — LOW (ref 4.2–5.8)
RBC # BLD: 2.39 M/UL — LOW (ref 4.2–5.8)
RBC # BLD: 2.47 M/UL — LOW (ref 4.2–5.8)
RBC # FLD: 12.2 % — SIGNIFICANT CHANGE UP (ref 10.3–14.5)
RBC # FLD: 12.3 % — SIGNIFICANT CHANGE UP (ref 10.3–14.5)
RBC # FLD: 12.4 % — SIGNIFICANT CHANGE UP (ref 10.3–14.5)
SODIUM SERPL-SCNC: 135 MMOL/L — SIGNIFICANT CHANGE UP (ref 135–145)
SODIUM SERPL-SCNC: 135 MMOL/L — SIGNIFICANT CHANGE UP (ref 135–145)
WBC # BLD: 6.7 K/UL — SIGNIFICANT CHANGE UP (ref 3.8–10.5)
WBC # BLD: 6.93 K/UL — SIGNIFICANT CHANGE UP (ref 3.8–10.5)
WBC # BLD: 7.17 K/UL — SIGNIFICANT CHANGE UP (ref 3.8–10.5)
WBC # FLD AUTO: 6.7 K/UL — SIGNIFICANT CHANGE UP (ref 3.8–10.5)
WBC # FLD AUTO: 6.93 K/UL — SIGNIFICANT CHANGE UP (ref 3.8–10.5)
WBC # FLD AUTO: 7.17 K/UL — SIGNIFICANT CHANGE UP (ref 3.8–10.5)

## 2022-08-17 PROCEDURE — 85018 HEMOGLOBIN: CPT

## 2022-08-17 PROCEDURE — 85610 PROTHROMBIN TIME: CPT

## 2022-08-17 PROCEDURE — 85027 COMPLETE CBC AUTOMATED: CPT

## 2022-08-17 PROCEDURE — 83735 ASSAY OF MAGNESIUM: CPT

## 2022-08-17 PROCEDURE — 80048 BASIC METABOLIC PNL TOTAL CA: CPT

## 2022-08-17 PROCEDURE — 86900 BLOOD TYPING SEROLOGIC ABO: CPT

## 2022-08-17 PROCEDURE — 84100 ASSAY OF PHOSPHORUS: CPT

## 2022-08-17 PROCEDURE — 82330 ASSAY OF CALCIUM: CPT

## 2022-08-17 PROCEDURE — C1889: CPT

## 2022-08-17 PROCEDURE — 86901 BLOOD TYPING SEROLOGIC RH(D): CPT

## 2022-08-17 PROCEDURE — 36415 COLL VENOUS BLD VENIPUNCTURE: CPT

## 2022-08-17 PROCEDURE — C1781: CPT

## 2022-08-17 PROCEDURE — 86923 COMPATIBILITY TEST ELECTRIC: CPT

## 2022-08-17 PROCEDURE — 36430 TRANSFUSION BLD/BLD COMPNT: CPT

## 2022-08-17 PROCEDURE — 85730 THROMBOPLASTIN TIME PARTIAL: CPT

## 2022-08-17 PROCEDURE — 93005 ELECTROCARDIOGRAM TRACING: CPT

## 2022-08-17 PROCEDURE — 80053 COMPREHEN METABOLIC PANEL: CPT

## 2022-08-17 PROCEDURE — 88302 TISSUE EXAM BY PATHOLOGIST: CPT

## 2022-08-17 PROCEDURE — 86850 RBC ANTIBODY SCREEN: CPT

## 2022-08-17 PROCEDURE — P9016: CPT

## 2022-08-17 RX ORDER — SECRETIN ACETATE (HUMAN) 16 MCG
19 VIAL (EA) INTRAVENOUS ONCE
Refills: 0 | Status: DISCONTINUED | OUTPATIENT
Start: 2022-08-17 | End: 2022-08-17

## 2022-08-17 RX ORDER — SENNA PLUS 8.6 MG/1
2 TABLET ORAL AT BEDTIME
Refills: 0 | Status: DISCONTINUED | OUTPATIENT
Start: 2022-08-17 | End: 2022-08-17

## 2022-08-17 RX ORDER — POLYETHYLENE GLYCOL 3350 17 G/17G
17 POWDER, FOR SOLUTION ORAL DAILY
Refills: 0 | Status: DISCONTINUED | OUTPATIENT
Start: 2022-08-17 | End: 2022-08-17

## 2022-08-17 RX ADMIN — GABAPENTIN 600 MILLIGRAM(S): 400 CAPSULE ORAL at 14:09

## 2022-08-17 RX ADMIN — Medication 1000 MILLIGRAM(S): at 05:28

## 2022-08-17 RX ADMIN — Medication 1000 MILLIGRAM(S): at 00:56

## 2022-08-17 RX ADMIN — POLYETHYLENE GLYCOL 3350 17 GRAM(S): 17 POWDER, FOR SOLUTION ORAL at 12:01

## 2022-08-17 RX ADMIN — Medication 1000 MILLIGRAM(S): at 19:58

## 2022-08-17 RX ADMIN — Medication 1000 MILLIGRAM(S): at 13:00

## 2022-08-17 RX ADMIN — Medication 1000 MILLIGRAM(S): at 05:58

## 2022-08-17 RX ADMIN — GABAPENTIN 600 MILLIGRAM(S): 400 CAPSULE ORAL at 05:28

## 2022-08-17 RX ADMIN — OXYCODONE HYDROCHLORIDE 5 MILLIGRAM(S): 5 TABLET ORAL at 13:21

## 2022-08-17 RX ADMIN — Medication 100 MILLIGRAM(S): at 14:28

## 2022-08-17 RX ADMIN — Medication 1000 MILLIGRAM(S): at 18:46

## 2022-08-17 RX ADMIN — Medication 100 MILLIGRAM(S): at 06:01

## 2022-08-17 RX ADMIN — OXYCODONE HYDROCHLORIDE 5 MILLIGRAM(S): 5 TABLET ORAL at 19:30

## 2022-08-17 RX ADMIN — HYDROMORPHONE HYDROCHLORIDE 0.5 MILLIGRAM(S): 2 INJECTION INTRAMUSCULAR; INTRAVENOUS; SUBCUTANEOUS at 00:55

## 2022-08-17 RX ADMIN — OXYCODONE HYDROCHLORIDE 5 MILLIGRAM(S): 5 TABLET ORAL at 14:00

## 2022-08-17 RX ADMIN — Medication 1000 MILLIGRAM(S): at 12:01

## 2022-08-17 RX ADMIN — OXYCODONE HYDROCHLORIDE 5 MILLIGRAM(S): 5 TABLET ORAL at 19:58

## 2022-08-17 RX ADMIN — HYDROMORPHONE HYDROCHLORIDE 0.5 MILLIGRAM(S): 2 INJECTION INTRAMUSCULAR; INTRAVENOUS; SUBCUTANEOUS at 01:15

## 2022-08-17 RX ADMIN — Medication 1000 MILLIGRAM(S): at 00:26

## 2022-08-17 NOTE — DISCHARGE NOTE NURSING/CASE MANAGEMENT/SOCIAL WORK - NSDCPEFALRISK_GEN_ALL_CORE
For information on Fall & Injury Prevention, visit: [email or web address] OR  [email or web address] OR  https://www.cdc.gov/steadi/patient.html

## 2022-08-17 NOTE — PROGRESS NOTE ADULT - SUBJECTIVE AND OBJECTIVE BOX
POD2 ventral hernia repair x2, abdomnioplasty, SAL back  POD1 emergent exploration for bleeding, hematoma evacuation    stable this morning  awake alert    VSS     incision abdomen: D CI  bruising as expected for this stage of healing  no hematoma    serousangious drain output    OK to d/c per plastics  f/u as instructed  care instructions given to the patient and wife verbally and written    all questions answered, all concerns adressed    I personally saw and examined the patient

## 2022-08-17 NOTE — PROGRESS NOTE ADULT - ASSESSMENT
47M with no significant PMH/PSH now s/p liposuction, abdominoplasty, and ventral incisional hernia repair with mesh x2 8/15 RTOR for bleed now s/p evacuation of 1.2L hematoma and control of bleeding perforating vessel 8/16.    Regular diet   Ancef   Pain/nausea control PRN   SCDs/IS/hold SQH   JAMES x2   Activity and positioning per plastic surgery  AM labs   
47M POD1 liposuction, abdominoplasty, and ventral incisional hernia repair with mesh x2 8/15. Patient persistently hypotensive with systolics in high 70-80s however asymptomatic, also experiences 2 episodes of lightheadedness without syncope while standing.    OR today  FLD  Ancef   Pain/nausea control PRN   OOBA/SCDs/SQH/IS  Activity and positioning per plastic surgery  AM labs   
Patient is a 46 y/o M POD1 s/p lipoabdominoplasty along with incisional and ventral hernia repairs with mesh.    Plan:  - RTOR this morning    Plastic Surgery

## 2022-08-17 NOTE — PROGRESS NOTE ADULT - SUBJECTIVE AND OBJECTIVE BOX
SUBJECTIVE: Feeling ok, madeline diet. Patient seen and examined bedside by chief resident.    ceFAZolin   IVPB 2000 milliGRAM(s) IV Intermittent every 8 hours    MEDICATIONS  (PRN):  ondansetron Injectable 4 milliGRAM(s) IV Push every 6 hours PRN Nausea and/or Vomiting  oxyCODONE    IR 5 milliGRAM(s) Oral every 6 hours PRN Severe Pain (7 - 10)      I&O's Detail    16 Aug 2022 07:01  -  17 Aug 2022 07:00  --------------------------------------------------------  IN:    Lactated Ringers: 1530 mL  Total IN: 1530 mL    OUT:    Bulb (mL): 33 mL    Bulb (mL): 307 mL    Indwelling Catheter - Urethral (mL): 2555 mL  Total OUT: 2895 mL    Total NET: -1365 mL          Vital Signs Last 24 Hrs  T(C): 37.1 (17 Aug 2022 04:05), Max: 38 (17 Aug 2022 00:27)  T(F): 98.8 (17 Aug 2022 04:05), Max: 100.4 (17 Aug 2022 00:27)  HR: 98 (17 Aug 2022 04:05) (62 - 132)  BP: 105/56 (17 Aug 2022 04:05) (91/54 - 116/68)  BP(mean): 75 (17 Aug 2022 04:05) (64 - 86)  RR: 17 (17 Aug 2022 04:05) (13 - 20)  SpO2: 96% (17 Aug 2022 04:05) (92% - 100%)    Parameters below as of 17 Aug 2022 04:05  Patient On (Oxygen Delivery Method): nasal cannula  O2 Flow (L/min): 2      General: NAD, resting comfortably in bed  C/V: NSR  Pulm: Nonlabored breathing, no respiratory distress  Abd: soft, NT/ND, diffuse ecchymosis, nontender, JAMES x2 SS  Extrem: SCDs in place    LABS:                        7.8    7.17  )-----------( 172      ( 17 Aug 2022 06:11 )             22.0     08-17    135  |  103  |  11  ----------------------------<  134<H>  4.3   |  27  |  0.86    Ca    7.9<L>      17 Aug 2022 06:11  Phos  3.1     08-17  Mg     1.9     08-17    TPro  x   /  Alb  x   /  TBili  0.4  /  DBili  x   /  AST  x   /  ALT  x   /  AlkPhos  x   08-17    PT/INR - ( 17 Aug 2022 06:11 )   PT: 12.7 sec;   INR: 1.07          PTT - ( 16 Aug 2022 06:43 )  PTT:23.0 sec

## 2022-08-17 NOTE — PATIENT PROFILE ADULT - DATE OF SECOND COVID-19 BOOSTER
Recommended weight and BMI control through healthy diet and exercise, green leafy veggies, UV protection, and not smoking. Reviewed the benefits of AREDS II VITAMINS VERUS GENOTYPE directed vitamin therapy, and recommended following one or the other to try and prevent the progression of the disease. Reviewed the importance of daily monitoring of the vision in each eye independently, along with the use of the Amsler grid daily and instructed patient to call and return immediately for any new changes in their vision or on the Amsler grid. Patient instructed on the importance of regular follow up and monitoring for the early detection of conversion to wet AMD as early detection results in early treatment and better outcomes. 02-Apr-2022

## 2022-08-17 NOTE — PROVIDER CONTACT NOTE (OTHER) - RECOMMENDATIONS
Tylenol given at midnight and Dilaudid given for pain. no further intervention for tylenol as per MD for fever.

## 2022-08-22 DIAGNOSIS — L76.21 POSTPROCEDURAL HEMORRHAGE OF SKIN AND SUBCUTANEOUS TISSUE FOLLOWING A DERMATOLOGIC PROCEDURE: ICD-10-CM
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
## 2022-08-22 DIAGNOSIS — K43.0 INCISIONAL HERNIA WITH OBSTRUCTION, WITHOUT GANGRENE: ICD-10-CM
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
## 2022-08-22 LAB — SURGICAL PATHOLOGY STUDY: SIGNIFICANT CHANGE UP
